# Patient Record
Sex: FEMALE | Race: BLACK OR AFRICAN AMERICAN | Employment: PART TIME | URBAN - METROPOLITAN AREA
[De-identification: names, ages, dates, MRNs, and addresses within clinical notes are randomized per-mention and may not be internally consistent; named-entity substitution may affect disease eponyms.]

---

## 2017-01-25 ENCOUNTER — OFFICE VISIT (OUTPATIENT)
Dept: PRIMARY CARE CLINIC | Age: 27
End: 2017-01-25

## 2017-01-25 VITALS
BODY MASS INDEX: 28.63 KG/M2 | HEART RATE: 72 BPM | HEIGHT: 63 IN | DIASTOLIC BLOOD PRESSURE: 68 MMHG | WEIGHT: 161.6 LBS | TEMPERATURE: 98.2 F | SYSTOLIC BLOOD PRESSURE: 122 MMHG

## 2017-01-25 DIAGNOSIS — R23.3 EASY BRUISING: ICD-10-CM

## 2017-01-25 DIAGNOSIS — Z00.00 WELL ADULT EXAM: Primary | ICD-10-CM

## 2017-01-25 PROCEDURE — 99395 PREV VISIT EST AGE 18-39: CPT | Performed by: INTERNAL MEDICINE

## 2017-01-26 LAB
CHLAMYDIA TRACHOMATIS AMPLIFIED DET: NORMAL
N GONORRHOEAE AMPLIFIED DET: NORMAL

## 2017-05-16 ENCOUNTER — OFFICE VISIT (OUTPATIENT)
Dept: PRIMARY CARE CLINIC | Age: 27
End: 2017-05-16

## 2017-05-16 VITALS
DIASTOLIC BLOOD PRESSURE: 62 MMHG | HEIGHT: 63 IN | WEIGHT: 164 LBS | SYSTOLIC BLOOD PRESSURE: 110 MMHG | BODY MASS INDEX: 29.06 KG/M2

## 2017-05-16 DIAGNOSIS — N76.0 ACUTE VAGINITIS: Primary | ICD-10-CM

## 2017-05-16 PROCEDURE — 99214 OFFICE O/P EST MOD 30 MIN: CPT | Performed by: INTERNAL MEDICINE

## 2017-05-18 ENCOUNTER — TELEPHONE (OUTPATIENT)
Dept: PRIMARY CARE CLINIC | Age: 27
End: 2017-05-18

## 2017-05-18 LAB
CANDIDA SPECIES, DNA PROBE: ABNORMAL
GARDNERELLA VAGINALIS, DNA PROBE: ABNORMAL
TRICHOMONAS VAGINALIS DNA: ABNORMAL

## 2017-05-18 RX ORDER — FLUCONAZOLE 150 MG/1
TABLET ORAL
Qty: 2 TABLET | Refills: 0 | Status: SHIPPED | OUTPATIENT
Start: 2017-05-18 | End: 2017-05-22 | Stop reason: SDUPTHER

## 2017-05-18 RX ORDER — METRONIDAZOLE 500 MG/1
500 TABLET ORAL 2 TIMES DAILY
Qty: 14 TABLET | Refills: 0 | Status: SHIPPED | OUTPATIENT
Start: 2017-05-18 | End: 2017-05-25

## 2017-05-19 RX ORDER — METRONIDAZOLE 7.5 MG/G
1 GEL VAGINAL DAILY
Qty: 5 TUBE | Refills: 0 | Status: SHIPPED | OUTPATIENT
Start: 2017-05-19 | End: 2017-05-22 | Stop reason: SDUPTHER

## 2017-05-19 RX ORDER — METRONIDAZOLE 10 MG/G
GEL TOPICAL DAILY
Qty: 1 TUBE | Refills: 0 | Status: CANCELLED | OUTPATIENT
Start: 2017-05-19

## 2017-05-22 RX ORDER — FLUCONAZOLE 150 MG/1
TABLET ORAL
Qty: 2 TABLET | Refills: 0 | Status: SHIPPED | OUTPATIENT
Start: 2017-05-22 | End: 2017-05-26

## 2017-05-22 RX ORDER — METRONIDAZOLE 7.5 MG/G
1 GEL VAGINAL DAILY
Qty: 5 TUBE | Refills: 0 | Status: SHIPPED | OUTPATIENT
Start: 2017-05-22 | End: 2017-05-27

## 2017-05-26 RX ORDER — FLUCONAZOLE 150 MG/1
TABLET ORAL
Qty: 2 TABLET | Refills: 0 | Status: SHIPPED | OUTPATIENT
Start: 2017-05-26 | End: 2018-02-13

## 2017-10-09 ENCOUNTER — OFFICE VISIT (OUTPATIENT)
Dept: PRIMARY CARE CLINIC | Age: 27
End: 2017-10-09

## 2017-10-09 VITALS
TEMPERATURE: 98.5 F | BODY MASS INDEX: 29.63 KG/M2 | SYSTOLIC BLOOD PRESSURE: 118 MMHG | DIASTOLIC BLOOD PRESSURE: 70 MMHG | WEIGHT: 161 LBS | HEIGHT: 62 IN

## 2017-10-09 DIAGNOSIS — R35.89 POLYURIA: Primary | ICD-10-CM

## 2017-10-09 DIAGNOSIS — N89.8 VAGINAL ITCHING: ICD-10-CM

## 2017-10-09 LAB
BILIRUBIN, POC: ABNORMAL
BLOOD URINE, POC: 6
CLARITY, POC: ABNORMAL
COLOR, POC: ABNORMAL
CONTROL: PRESENT
GLUCOSE URINE, POC: ABNORMAL
KETONES, POC: ABNORMAL
LEUKOCYTE EST, POC: ABNORMAL
NITRITE, POC: ABNORMAL
PH, POC: ABNORMAL
PREGNANCY TEST URINE, POC: NORMAL
PROTEIN, POC: ABNORMAL
SPECIFIC GRAVITY, POC: 1.02
UROBILINOGEN, POC: 0.2

## 2017-10-09 PROCEDURE — 81002 URINALYSIS NONAUTO W/O SCOPE: CPT | Performed by: INTERNAL MEDICINE

## 2017-10-09 PROCEDURE — 81025 URINE PREGNANCY TEST: CPT | Performed by: INTERNAL MEDICINE

## 2017-10-09 PROCEDURE — 99213 OFFICE O/P EST LOW 20 MIN: CPT | Performed by: INTERNAL MEDICINE

## 2017-10-09 RX ORDER — CIPROFLOXACIN 250 MG/1
250 TABLET, FILM COATED ORAL 2 TIMES DAILY
Qty: 10 TABLET | Refills: 0 | Status: SHIPPED | OUTPATIENT
Start: 2017-10-09 | End: 2017-10-14

## 2017-10-10 RX ORDER — METRONIDAZOLE 500 MG/1
500 TABLET ORAL 2 TIMES DAILY
Qty: 14 TABLET | Refills: 0 | Status: SHIPPED | OUTPATIENT
Start: 2017-10-10 | End: 2017-10-23 | Stop reason: SDUPTHER

## 2017-10-11 ENCOUNTER — TELEPHONE (OUTPATIENT)
Dept: PRIMARY CARE CLINIC | Age: 27
End: 2017-10-11

## 2017-10-11 LAB
C. TRACHOMATIS DNA ,URINE: NEGATIVE
N. GONORRHOEAE DNA, URINE: NEGATIVE
URINE CULTURE, ROUTINE: NORMAL

## 2017-10-11 NOTE — TELEPHONE ENCOUNTER
Patient said he wanted to gel form.  SHe said that is what she has used in the past. I tried to explain that an antibiotic was needed but she said to let you know that she wanted the gel form

## 2017-10-12 RX ORDER — METRONIDAZOLE 7.5 MG/G
1 GEL VAGINAL 2 TIMES DAILY
Qty: 1 TUBE | Refills: 0 | Status: SHIPPED | OUTPATIENT
Start: 2017-10-12 | End: 2017-10-16 | Stop reason: SDUPTHER

## 2017-10-16 RX ORDER — METRONIDAZOLE 7.5 MG/G
1 GEL VAGINAL 2 TIMES DAILY
Qty: 1 TUBE | Refills: 0 | Status: SHIPPED | OUTPATIENT
Start: 2017-10-16 | End: 2017-10-21

## 2017-10-23 ENCOUNTER — OFFICE VISIT (OUTPATIENT)
Dept: PRIMARY CARE CLINIC | Age: 27
End: 2017-10-23

## 2017-10-23 VITALS
SYSTOLIC BLOOD PRESSURE: 120 MMHG | DIASTOLIC BLOOD PRESSURE: 70 MMHG | HEIGHT: 62 IN | TEMPERATURE: 98.3 F | WEIGHT: 163 LBS | BODY MASS INDEX: 30 KG/M2

## 2017-10-23 DIAGNOSIS — N93.9 VAGINAL BLEEDING: Primary | ICD-10-CM

## 2017-10-23 LAB
CONTROL: PRESENT
PREGNANCY TEST URINE, POC: NEGATIVE

## 2017-10-23 PROCEDURE — 81025 URINE PREGNANCY TEST: CPT | Performed by: INTERNAL MEDICINE

## 2017-10-23 PROCEDURE — 99214 OFFICE O/P EST MOD 30 MIN: CPT | Performed by: INTERNAL MEDICINE

## 2017-10-23 RX ORDER — METRONIDAZOLE 500 MG/1
500 TABLET ORAL 2 TIMES DAILY
Qty: 14 TABLET | Refills: 0 | Status: SHIPPED | OUTPATIENT
Start: 2017-10-23 | End: 2018-09-11 | Stop reason: SDUPTHER

## 2017-10-23 ASSESSMENT — ENCOUNTER SYMPTOMS
SORE THROAT: 0
COUGH: 0
CHEST TIGHTNESS: 0
VOMITING: 0
SHORTNESS OF BREATH: 0
ABDOMINAL PAIN: 0
NAUSEA: 0

## 2017-10-23 NOTE — PROGRESS NOTES
Subjective:      Patient ID: Bakari Smith is a 24 y.o. female. HPI   Patient with 7 days of spotting which is unusual for her. Her menses should have started around this time. No other symptoms. Had vaginal probe and urine studies 2 weeks ago for vaginal itching and diagnosed with BV but did not take med. No current symptoms. LMP on Sept 23 for 4 days. Review of Systems   Constitutional: Negative for activity change, appetite change, chills, diaphoresis, fever and unexpected weight change. HENT: Negative for sore throat. Respiratory: Negative for cough, chest tightness and shortness of breath. Cardiovascular: Negative for chest pain and palpitations. Gastrointestinal: Negative for abdominal pain, nausea and vomiting. Genitourinary: Positive for vaginal bleeding. Negative for difficulty urinating, dyspareunia, dysuria, flank pain, hematuria, pelvic pain, urgency, vaginal discharge and vaginal pain. Skin: Negative for rash. Objective:   Physical Exam   Constitutional: She appears well-developed and well-nourished. No distress. HENT:   Head: Normocephalic and atraumatic. Eyes: Conjunctivae are normal. Right eye exhibits no discharge. Left eye exhibits no discharge. No scleral icterus. Cardiovascular: Normal rate, regular rhythm and normal heart sounds. Pulmonary/Chest: Effort normal. No respiratory distress. Abdominal: Soft. Bowel sounds are normal. There is no tenderness. Neurological: She is alert. Coordination normal.   Skin: Skin is warm and dry. She is not diaphoretic. Psychiatric: She has a normal mood and affect. Judgment normal.   Vitals reviewed. Assessment:     A total of 25 minutes spent with the patient today greater than 50% in counseling regarding these issues. Vaginal bleeding. Around right time for menses but only spotting. Pregnancy test was negative. No other vaginal or urinary symptoms. Did not treat herself for BV yet.  Patient has made

## 2017-10-23 NOTE — PATIENT INSTRUCTIONS
Patient Education        Bacterial Vaginosis: Care Instructions  Your Care Instructions    Bacterial vaginosis is a type of vaginal infection. It is caused by excess growth of certain bacteria that are normally found in the vagina. Symptoms can include itching, swelling, pain when you urinate or have sex, and a gray or yellow discharge with a \"fishy\" odor. It is not considered an infection that is spread through sexual contact. Although symptoms can be annoying and uncomfortable, bacterial vaginosis does not usually cause other health problems. However, if you have it while you are pregnant, it can cause complications. While the infection may go away on its own, most doctors use antibiotics to treat it. You may have been prescribed pills or vaginal cream. With treatment, bacterial vaginosis usually clears up in 5 to 7 days. Follow-up care is a key part of your treatment and safety. Be sure to make and go to all appointments, and call your doctor if you are having problems. It's also a good idea to know your test results and keep a list of the medicines you take. How can you care for yourself at home? · Take your antibiotics as directed. Do not stop taking them just because you feel better. You need to take the full course of antibiotics. · Do not eat or drink anything that contains alcohol if you are taking metronidazole (Flagyl). · Keep using your medicine if you start your period. Use pads instead of tampons while using a vaginal cream or suppository. Tampons can absorb the medicine. · Wear loose cotton clothing. Do not wear nylon and other materials that hold body heat and moisture close to the skin. · Do not scratch. Relieve itching with a cold pack or a cool bath. · Do not wash your vaginal area more than once a day. Use plain water or a mild, unscented soap. Do not douche. When should you call for help?   Watch closely for changes in your health, and be sure to contact your doctor if:  · You have unexpected vaginal bleeding. · You have a fever. · You have new or increased pain in your vagina or pelvis. · You are not getting better after 1 week. · Your symptoms return after you finish the course of your medicine. Where can you learn more? Go to [email or web address] [email or web address] and sign in to your Access Intelligence account. Enter S701 in the Fantom box to learn more about \"Bacterial Vaginosis: Care Instructions. \"     If you do not have an account, please click on the \"Sign Up Now\" link. Current as of: October 13, 2016  Content Version: 11.3  © 3488-8286 [employer]. Care instructions adapted under license by [facility] ([facility]). If you have questions about a medical condition or this instruction, always ask your healthcare professional. [address] any warranty or liability for your use of this information.

## 2018-01-04 ENCOUNTER — OFFICE VISIT (OUTPATIENT)
Dept: PRIMARY CARE CLINIC | Age: 28
End: 2018-01-04

## 2018-01-04 VITALS
WEIGHT: 164.4 LBS | BODY MASS INDEX: 30.07 KG/M2 | SYSTOLIC BLOOD PRESSURE: 110 MMHG | TEMPERATURE: 98.5 F | DIASTOLIC BLOOD PRESSURE: 60 MMHG

## 2018-01-04 DIAGNOSIS — Z30.8 ENCOUNTER FOR OTHER CONTRACEPTIVE MANAGEMENT: ICD-10-CM

## 2018-01-04 DIAGNOSIS — R30.0 DYSURIA: Primary | ICD-10-CM

## 2018-01-04 DIAGNOSIS — N89.8 VAGINAL ODOR: ICD-10-CM

## 2018-01-04 LAB
BILIRUBIN, POC: NORMAL
BLOOD URINE, POC: NORMAL
CLARITY, POC: NORMAL
COLOR, POC: NORMAL
GLUCOSE URINE, POC: NORMAL
KETONES, POC: NORMAL
LEUKOCYTE EST, POC: NORMAL
NITRITE, POC: NORMAL
PH, POC: 6
PROTEIN, POC: NORMAL
SPECIFIC GRAVITY, POC: 1.01
UROBILINOGEN, POC: NORMAL

## 2018-01-04 PROCEDURE — 81002 URINALYSIS NONAUTO W/O SCOPE: CPT | Performed by: INTERNAL MEDICINE

## 2018-01-04 PROCEDURE — 99214 OFFICE O/P EST MOD 30 MIN: CPT | Performed by: INTERNAL MEDICINE

## 2018-01-04 NOTE — PROGRESS NOTES
Lg Johnson is a 24 y.o. female presenting today with c/o    Vaginal odor  No dysuria  No vaginal discharge  No increased urinary frequency  No nausea, vomiting, back pain  No sensation of incomplete voidine  Noticed 4 days ago  Has not worsened  Has tried no treatment  Fishy odor  Has had BV in past  Last period 17  Had retained tampon  Sexually active  Not using birth control. Last sexually active 4 days ago-tok plan B  Has emailed gynecologist for birth control. Has not heard back  Tried pill, but would forget it   Will be do for pap this April and will schedule with gyn      Review of Systems - comprehensive review of systems negative except as noted in HPI    No Known Allergies    Past Medical History:   Diagnosis Date    Headache(784.0)        No past surgical history on file. Family History   Problem Relation Age of Onset    Heart Disease Father 35      from [place]       Social History     Social History    Marital status: Single     Spouse name: N/A    Number of children: N/A    Years of education: N/A     Occupational History    Not on file. Social History Main Topics    Smoking status: Never Smoker    Smokeless tobacco: Never Used    Alcohol use Yes      Comment: Weekends    Drug use: No    Sexual activity: Not Currently     Partners: Male     Other Topics Concern    Not on file     Social History Narrative    No narrative on file         Current Outpatient Prescriptions on File Prior to Visit   Medication Sig Dispense Refill    fluconazole (DIFLUCAN) 150 MG tablet TAKE 1 TABLET BY MOUTH TODAY. REPEAT AGAIN IN 7 DAYS 2 tablet 0     No current facility-administered medications on file prior to visit.         Vitals:    18 0831   BP: 110/60   Temp: 98.5 °F (36.9 °C)         Wt Readings from Last 3 Encounters:   18 164 lb 6.4 oz (74.6 kg)   10/23/17 163 lb (73.9 kg)   10/09/17 161 lb (73 kg)     BP Readings from Last 3 Encounters:   18 110/60   10/23/17

## 2018-01-05 ENCOUNTER — TELEPHONE (OUTPATIENT)
Dept: PAIN MANAGEMENT | Age: 28
End: 2018-01-05

## 2018-01-05 RX ORDER — METRONIDAZOLE 500 MG/1
500 TABLET ORAL 2 TIMES DAILY
Qty: 14 TABLET | Refills: 0 | Status: SHIPPED | OUTPATIENT
Start: 2018-01-05 | End: 2018-01-12

## 2018-01-06 LAB — URINE CULTURE, ROUTINE: NORMAL

## 2018-02-02 RX ORDER — NORELGESTROMIN AND ETHINYL ESTRADIOL 150; 35 UG/D; UG/D
PATCH TRANSDERMAL
Qty: 12 PATCH | Refills: 11 | Status: SHIPPED | OUTPATIENT
Start: 2018-02-02 | End: 2022-01-22 | Stop reason: ALTCHOICE

## 2018-02-13 ENCOUNTER — OFFICE VISIT (OUTPATIENT)
Dept: PRIMARY CARE CLINIC | Age: 28
End: 2018-02-13

## 2018-02-13 VITALS
SYSTOLIC BLOOD PRESSURE: 128 MMHG | DIASTOLIC BLOOD PRESSURE: 86 MMHG | HEART RATE: 64 BPM | BODY MASS INDEX: 31.17 KG/M2 | WEIGHT: 170.4 LBS

## 2018-02-13 DIAGNOSIS — R11.0 NAUSEA: Primary | ICD-10-CM

## 2018-02-13 DIAGNOSIS — Z20.2 STD EXPOSURE: ICD-10-CM

## 2018-02-13 PROCEDURE — 81025 URINE PREGNANCY TEST: CPT | Performed by: INTERNAL MEDICINE

## 2018-02-13 PROCEDURE — 99214 OFFICE O/P EST MOD 30 MIN: CPT | Performed by: INTERNAL MEDICINE

## 2018-02-13 NOTE — PROGRESS NOTES
kg)   01/04/18 164 lb 6.4 oz (74.6 kg)   10/23/17 163 lb (73.9 kg)     BP Readings from Last 3 Encounters:   02/13/18 128/86   01/04/18 110/60   10/23/17 120/70         /86 (Site: Right Arm, Position: Sitting, Cuff Size: Medium Adult)   Pulse 64   Wt 170 lb 6.4 oz (77.3 kg)   BMI 31.17 kg/m²   General appearance: alert, appears stated age and no distress  Eyes: negative findings: lids and lashes normal and conjunctivae and sclerae normal  Throat: lips, mucosa, and tongue normal; teeth and gums normal  Neck: no adenopathy and thyroid not enlarged, symmetric, no tenderness/mass/nodules  Lungs: clear to auscultation bilaterally  Heart: regular rate and rhythm, S1, S2 normal, no murmur, click, rub or gallop  Skin: Skin is warm and dry. Benjamen Lies Psychiatric: normal mood and affect. speech is normal and behavior is normal. Judgment, cognition and memory are normal.     not applicable    Assessment and Plan  1. Nausea  Neg upreg  Not nauseated currently  Discussed possible viral illness when nauseated and tired  Could consider IUD. She will continue patch for now and discuss with gyn  - POCT urine pregnancy    2. STD exposure  Advised barrier protection to prevent STD's  - Vaginal Pathogens Probes *A  - C.trachomatis N.gonorrhoeae DNA, Urine  - RPR Reflex  - HIV Screen    3.  BMI 31  Continued wt gain  Discussed this is likely source of breast enlargement  D/c soda

## 2018-02-14 LAB
C. TRACHOMATIS DNA ,URINE: NEGATIVE
CANDIDA SPECIES, DNA PROBE: ABNORMAL
GARDNERELLA VAGINALIS, DNA PROBE: ABNORMAL
HIV AG/AB: NORMAL
HIV ANTIGEN: NORMAL
HIV-1 ANTIBODY: NORMAL
HIV-2 AB: NORMAL
N. GONORRHOEAE DNA, URINE: NEGATIVE
TRICHOMONAS VAGINALIS DNA: ABNORMAL

## 2018-02-15 ENCOUNTER — TELEPHONE (OUTPATIENT)
Dept: PRIMARY CARE CLINIC | Age: 28
End: 2018-02-15

## 2018-02-15 LAB — RPR: NON REACTIVE

## 2018-02-15 RX ORDER — METRONIDAZOLE 500 MG/1
500 TABLET ORAL 2 TIMES DAILY
Qty: 14 TABLET | Refills: 0 | Status: SHIPPED | OUTPATIENT
Start: 2018-02-15 | End: 2018-02-25

## 2018-09-07 ENCOUNTER — OFFICE VISIT (OUTPATIENT)
Dept: PRIMARY CARE CLINIC | Age: 28
End: 2018-09-07

## 2018-09-07 VITALS — SYSTOLIC BLOOD PRESSURE: 110 MMHG | WEIGHT: 177 LBS | DIASTOLIC BLOOD PRESSURE: 80 MMHG | BODY MASS INDEX: 32.37 KG/M2

## 2018-09-07 DIAGNOSIS — R35.0 URINE FREQUENCY: Primary | ICD-10-CM

## 2018-09-07 LAB
BILIRUBIN, POC: NORMAL
BLOOD URINE, POC: NORMAL
CLARITY, POC: NORMAL
COLOR, POC: YELLOW
GLUCOSE URINE, POC: NORMAL
KETONES, POC: NORMAL
LEUKOCYTE EST, POC: NORMAL
NITRITE, POC: NORMAL
PH, POC: 7.5
PROTEIN, POC: NORMAL
SPECIFIC GRAVITY, POC: 1.01
UROBILINOGEN, POC: NORMAL

## 2018-09-07 PROCEDURE — 99213 OFFICE O/P EST LOW 20 MIN: CPT | Performed by: INTERNAL MEDICINE

## 2018-09-07 PROCEDURE — 81002 URINALYSIS NONAUTO W/O SCOPE: CPT | Performed by: INTERNAL MEDICINE

## 2018-09-07 RX ORDER — CIPROFLOXACIN 250 MG/1
250 TABLET, FILM COATED ORAL 2 TIMES DAILY
Qty: 6 TABLET | Refills: 0 | Status: SHIPPED | OUTPATIENT
Start: 2018-09-07 | End: 2018-09-10

## 2018-09-07 ASSESSMENT — PATIENT HEALTH QUESTIONNAIRE - PHQ9
SUM OF ALL RESPONSES TO PHQ QUESTIONS 1-9: 0
1. LITTLE INTEREST OR PLEASURE IN DOING THINGS: 0
SUM OF ALL RESPONSES TO PHQ9 QUESTIONS 1 & 2: 0
SUM OF ALL RESPONSES TO PHQ QUESTIONS 1-9: 0
2. FEELING DOWN, DEPRESSED OR HOPELESS: 0

## 2018-09-07 NOTE — PATIENT INSTRUCTIONS
Patient Education        Starting a Weight Loss Plan: Care Instructions  Your Care Instructions    If you are thinking about losing weight, it can be hard to know where to start. Your doctor can help you set up a weight loss plan that best meets your needs. You may want to take a class on nutrition or exercise, or join a weight loss support group. If you have questions about how to make changes to your eating or exercise habits, ask your doctor about seeing a registered dietitian or an exercise specialist.  It can be a big challenge to lose weight. But you do not have to make huge changes at once. Make small changes, and stick with them. When those changes become habit, add a few more changes. If you do not think you are ready to make changes right now, try to pick a date in the future. Make an appointment to see your doctor to discuss whether the time is right for you to start a plan. Follow-up care is a key part of your treatment and safety. Be sure to make and go to all appointments, and call your doctor if you are having problems. It's also a good idea to know your test results and keep a list of the medicines you take. How can you care for yourself at home? · Set realistic goals. Many people expect to lose much more weight than is likely. A weight loss of 5% to 10% of your body weight may be enough to improve your health. · Get family and friends involved to provide support. Talk to them about why you are trying to lose weight, and ask them to help. They can help by participating in exercise and having meals with you, even if they may be eating something different. · Find what works best for you. If you do not have time or do not like to cook, a program that offers meal replacement bars or shakes may be better for you.  Or if you like to prepare meals, finding a plan that includes daily menus and recipes may be best.  · Ask your doctor about other health professionals who can help you achieve your weight

## 2018-09-09 LAB
CANDIDA SPECIES, DNA PROBE: ABNORMAL
GARDNERELLA VAGINALIS, DNA PROBE: ABNORMAL
TRICHOMONAS VAGINALIS DNA: ABNORMAL
URINE CULTURE, ROUTINE: NORMAL

## 2018-09-11 LAB
C. TRACHOMATIS DNA ,URINE: NEGATIVE
N. GONORRHOEAE DNA, URINE: NEGATIVE

## 2018-09-11 RX ORDER — METRONIDAZOLE 500 MG/1
500 TABLET ORAL 2 TIMES DAILY
Qty: 14 TABLET | Refills: 0 | Status: SHIPPED | OUTPATIENT
Start: 2018-09-11 | End: 2018-09-18

## 2019-05-26 ENCOUNTER — HOSPITAL ENCOUNTER (EMERGENCY)
Age: 29
Discharge: HOME | End: 2019-05-26
Attending: EMERGENCY MEDICINE
Payer: COMMERCIAL

## 2019-05-26 VITALS
OXYGEN SATURATION: 100 % | BODY MASS INDEX: 33.45 KG/M2 | HEART RATE: 99 BPM | SYSTOLIC BLOOD PRESSURE: 119 MMHG | WEIGHT: 188.8 LBS | DIASTOLIC BLOOD PRESSURE: 64 MMHG | RESPIRATION RATE: 20 BRPM | TEMPERATURE: 99 F | HEIGHT: 63 IN
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
## 2019-05-26 DIAGNOSIS — S10.91XA ABRASION OF NECK, INITIAL ENCOUNTER: ICD-10-CM

## 2019-05-26 LAB
BASOPHILS ABSOLUTE: 0.1 K/UL (ref 0–0.2)
BASOPHILS RELATIVE PERCENT: 0.5 %
EOSINOPHILS ABSOLUTE: 0.1 K/UL (ref 0–0.6)
EOSINOPHILS RELATIVE PERCENT: 1.1 %
HCT VFR BLD CALC: 38.1 % (ref 36–48)
HEMOGLOBIN: 12.4 G/DL (ref 12–16)
LYMPHOCYTES ABSOLUTE: 1.5 K/UL (ref 1–5.1)
LYMPHOCYTES RELATIVE PERCENT: 13.8 %
MCH RBC QN AUTO: 29.2 PG (ref 26–34)
MCHC RBC AUTO-ENTMCNC: 32.6 G/DL (ref 31–36)
MCV RBC AUTO: 89.6 FL (ref 80–100)
MONOCYTES ABSOLUTE: 1 K/UL (ref 0–1.3)
MONOCYTES RELATIVE PERCENT: 8.6 %
NEUTROPHILS ABSOLUTE: 8.4 K/UL (ref 1.7–7.7)
NEUTROPHILS RELATIVE PERCENT: 76 %
PDW BLD-RTO: 14.7 % (ref 12.4–15.4)
PLATELET # BLD: 230 K/UL (ref 135–450)
PMV BLD AUTO: 7.3 FL (ref 5–10.5)
RBC # BLD: 4.25 M/UL (ref 4–5.2)
WBC # BLD: 11 K/UL (ref 4–11)

## 2019-05-26 PROCEDURE — 6360000002 HC RX W HCPCS: Performed by: EMERGENCY MEDICINE

## 2019-05-26 PROCEDURE — 85025 COMPLETE CBC W/AUTO DIFF WBC: CPT

## 2019-05-26 PROCEDURE — 90471 IMMUNIZATION ADMIN: CPT | Performed by: EMERGENCY MEDICINE

## 2019-05-26 PROCEDURE — 36415 COLL VENOUS BLD VENIPUNCTURE: CPT

## 2019-05-26 PROCEDURE — 99284 EMERGENCY DEPT VISIT MOD MDM: CPT

## 2019-05-26 PROCEDURE — 90715 TDAP VACCINE 7 YRS/> IM: CPT | Performed by: EMERGENCY MEDICINE

## 2019-05-26 RX ORDER — AMOXICILLIN AND CLAVULANATE POTASSIUM 875; 125 MG/1; MG/1
1 TABLET, FILM COATED ORAL 2 TIMES DAILY
Qty: 20 TABLET | Refills: 0 | Status: SHIPPED | OUTPATIENT
Start: 2019-05-26 | End: 2019-06-05

## 2019-05-26 RX ADMIN — TETANUS TOXOID, REDUCED DIPHTHERIA TOXOID AND ACELLULAR PERTUSSIS VACCINE, ADSORBED 0.5 ML: 5; 2.5; 8; 8; 2.5 SUSPENSION INTRAMUSCULAR at 12:14

## 2019-05-26 ASSESSMENT — PAIN DESCRIPTION - LOCATION: LOCATION: FACE

## 2019-05-26 ASSESSMENT — PAIN DESCRIPTION - DESCRIPTORS: DESCRIPTORS: DISCOMFORT

## 2019-05-26 ASSESSMENT — PAIN SCALES - GENERAL: PAINLEVEL_OUTOF10: 2

## 2019-05-26 ASSESSMENT — PAIN DESCRIPTION - PAIN TYPE: TYPE: ACUTE PAIN
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
## 2022-01-22 ENCOUNTER — APPOINTMENT (OUTPATIENT)
Dept: ULTRASOUND IMAGING | Age: 32
End: 2022-01-22
Payer: COMMERCIAL

## 2022-01-22 ENCOUNTER — HOSPITAL ENCOUNTER (EMERGENCY)
Age: 32
Discharge: HOME | End: 2022-01-22
Attending: EMERGENCY MEDICINE
Payer: COMMERCIAL

## 2022-01-22 VITALS
TEMPERATURE: 98.6 F | SYSTOLIC BLOOD PRESSURE: 122 MMHG | WEIGHT: 203.5 LBS | OXYGEN SATURATION: 100 % | HEART RATE: 80 BPM | RESPIRATION RATE: 16 BRPM | DIASTOLIC BLOOD PRESSURE: 65 MMHG | BODY MASS INDEX: 37.45 KG/M2 | HEIGHT: 62 IN

## 2022-01-22 DIAGNOSIS — O20.0 THREATENED MISCARRIAGE: Primary | ICD-10-CM

## 2022-01-22 LAB
ABO/RH: NORMAL
ANTIBODY SCREEN: NORMAL
BACTERIA WET PREP: NORMAL
BASOPHILS ABSOLUTE: 0.1 K/UL (ref 0–0.2)
BASOPHILS RELATIVE PERCENT: 0.9 %
CLUE CELLS: NORMAL
EOSINOPHILS ABSOLUTE: 0.2 K/UL (ref 0–0.6)
EOSINOPHILS RELATIVE PERCENT: 1.8 %
EPITHELIAL CELLS WET PREP: NORMAL
GONADOTROPIN, CHORIONIC (HCG) QUANT: 7.8 MIU/ML
HCG QUALITATIVE: POSITIVE
HCT VFR BLD CALC: 39.4 % (ref 36–48)
HEMOGLOBIN: 13.3 G/DL (ref 12–16)
LYMPHOCYTES ABSOLUTE: 1.8 K/UL (ref 1–5.1)
LYMPHOCYTES RELATIVE PERCENT: 21.4 %
MCH RBC QN AUTO: 30 PG (ref 26–34)
MCHC RBC AUTO-ENTMCNC: 33.8 G/DL (ref 31–36)
MCV RBC AUTO: 88.7 FL (ref 80–100)
MONOCYTES ABSOLUTE: 0.6 K/UL (ref 0–1.3)
MONOCYTES RELATIVE PERCENT: 6.9 %
NEUTROPHILS ABSOLUTE: 5.8 K/UL (ref 1.7–7.7)
NEUTROPHILS RELATIVE PERCENT: 69 %
PDW BLD-RTO: 14.3 % (ref 12.4–15.4)
PLATELET # BLD: 285 K/UL (ref 135–450)
PMV BLD AUTO: 8.6 FL (ref 5–10.5)
RBC # BLD: 4.44 M/UL (ref 4–5.2)
RBC WET PREP: NORMAL
SOURCE WET PREP: NORMAL
TRICHOMONAS PREP: NORMAL
WBC # BLD: 8.4 K/UL (ref 4–11)
WBC WET PREP: NORMAL
YEAST WET PREP: NORMAL

## 2022-01-22 PROCEDURE — 87210 SMEAR WET MOUNT SALINE/INK: CPT

## 2022-01-22 PROCEDURE — 99283 EMERGENCY DEPT VISIT LOW MDM: CPT

## 2022-01-22 PROCEDURE — 86850 RBC ANTIBODY SCREEN: CPT

## 2022-01-22 PROCEDURE — 84702 CHORIONIC GONADOTROPIN TEST: CPT

## 2022-01-22 PROCEDURE — 84703 CHORIONIC GONADOTROPIN ASSAY: CPT

## 2022-01-22 PROCEDURE — 87491 CHLMYD TRACH DNA AMP PROBE: CPT

## 2022-01-22 PROCEDURE — 76801 OB US < 14 WKS SINGLE FETUS: CPT

## 2022-01-22 PROCEDURE — 87591 N.GONORRHOEAE DNA AMP PROB: CPT

## 2022-01-22 PROCEDURE — 85025 COMPLETE CBC W/AUTO DIFF WBC: CPT

## 2022-01-22 PROCEDURE — 86900 BLOOD TYPING SEROLOGIC ABO: CPT

## 2022-01-22 PROCEDURE — 36415 COLL VENOUS BLD VENIPUNCTURE: CPT

## 2022-01-22 PROCEDURE — 86901 BLOOD TYPING SEROLOGIC RH(D): CPT

## 2022-01-22 NOTE — ED NOTES
Bed: B13-13  Expected date:   Expected time:   Means of arrival:   Comments:  NADIR Johnson RN  01/22/22 [id number]

## 2022-01-22 NOTE — ED NOTES
IV attempted in right ac without success although blood specimens obtained and will send to lab.       Fernando Lara RN  01/22/22 2314

## 2022-01-22 NOTE — ED NOTES
Pt states that her last period was December 5th and she missed her period in January and took a home pregnancy test Sunday and it was positive and pt states that she started with vaginal bleeding yesterday and did pass tissue at home and pt denies any pain at this time.       Glenys Mayer RN  01/22/22 5169

## 2022-01-22 NOTE — ED PROVIDER NOTES
[address] [facility]          ATTENDING PHYSICIAN NOTE       Date of evaluation: 2022    Chief Complaint     Vaginal Bleeding (pt states having bleeding yesterday and she took home pregnancy tests on  and 2 were positive)      History of Present Illness     Brenton Castillo is a 36 y.o. female who presents with vaginal bleeding. Patient states she is approximately 3 weeks pregnant. She has been pregnant 1 other time in the past where she had an . She noticed vaginal bleeding that started yesterday. She went through 2 pads overnight. She does not have significant abdominal pain with her symptoms. She was initially seen in the [place] ER. Her hCG quant was 7.8. CBC with normal hemoglobin. She was transferred to the [facility] [place] [facility] for:  Ultrasound to assess for ectopic  Pelvic examination  Type and screen to assess for Rh    Review of Systems     Positive for: vaginal bleeding  Negative for: Abdominal pain, syncope, dizziness    Other systems reviewed and negative. Past Medical, Surgical, Family, and Social History     She has a past medical history of Headache(784.0). She has no past surgical history on file. Her family history includes Heart Disease (age of onset: 35) in her father. She reports that she has never smoked. She has never used smokeless tobacco. She reports current alcohol use. She reports that she does not use drugs. Medications     There are no discharge medications for this patient. Allergies     She has No Known Allergies.     Physical Exam     INITIAL VITALS: BP: 135/64, Temp: 98.1 °F (36.7 °C), Pulse: 88, Resp: 18, SpO2: 100 %     General: nontoxic, no acute distress   HEENT: NCAT, EOMI grossly intact, external nose normal, no stridor  Neck: supple, no pain with movement  Cardiac: normal rate, regular rhythm, well perfused  Respiratory:  no respiratory distress, no cough  Abdominal: soft, nontender to palpation, no rebound tenderness  Pelvic: Chaperoned pelvic exam shows no significant external lesions, small amount of blood in vaginal vault, cervix appears closed on speculum exam, no significant discharge, no CMT during bimanual exam, cervix feels closed on bimanual exam  Extremities: no pitting edema  Musculoskeletal: no long bone deformities  Skin: no diaphoresis, no rash  Neuro: alert and oriented, moving extremities symmetrically, clear speech  Psych: appropriate mood, normal affect    Diagnostic Results     EKG    N/A    RADIOLOGY:  US OB LESS THAN 14 WEEKS SINGLE OR FIRST GESTATION   Final Result   1. No intrauterine gestational sac identified. No adnexal mass identified. Clinical correlation and correlation with serial beta hCG measurements recommended.           LABS:   Results for orders placed or performed during the hospital encounter of 01/22/22   Wet prep, genital    Specimen: Vaginal   Result Value Ref Range    Trichomonas Prep None Seen     Yeast, Wet Prep None Seen     Clue Cells, Wet Prep None Seen     WBC, Wet Prep <1+     RBC, Wet Prep <1+     Epi Cells <1+     Bacteria 2+     Source Wet Prep Vaginal    CBC Auto Differential   Result Value Ref Range    WBC 8.4 4.0 - 11.0 K/uL    RBC 4.44 4.00 - 5.20 M/uL    Hemoglobin 13.3 12.0 - 16.0 g/dL    Hematocrit 39.4 36.0 - 48.0 %    MCV 88.7 80.0 - 100.0 fL    MCH 30.0 26.0 - 34.0 pg    MCHC 33.8 31.0 - 36.0 g/dL    RDW 14.3 12.4 - 15.4 %    Platelets 848 359 - 245 K/uL    MPV 8.6 5.0 - 10.5 fL    Neutrophils % 69.0 %    Lymphocytes % 21.4 %    Monocytes % 6.9 %    Eosinophils % 1.8 %    Basophils % 0.9 %    Neutrophils Absolute 5.8 1.7 - 7.7 K/uL    Lymphocytes Absolute 1.8 1.0 - 5.1 K/uL    Monocytes Absolute 0.6 0.0 - 1.3 K/uL    Eosinophils Absolute 0.2 0.0 - 0.6 K/uL    Basophils Absolute 0.1 0.0 - 0.2 K/uL   HCG Qualitative, Serum   Result Value Ref Range    hCG Qual POSITIVE Detects HCG level >10 MIU/mL   HCG, Quantitative, Pregnancy   Result Value Ref Range hCG Quant 7.8 <5.0 mIU/mL   TYPE AND SCREEN   Result Value Ref Range    ABO/Rh O POS     Antibody Screen NEG        ED BEDSIDE ULTRASOUND:   N/A    RECENT VITALS:  BP: 122/65,Temp: 98.6 °F (37 °C), Pulse: 80, Resp: 16, SpO2: 100 %     Procedures      N/A    ED Course     Nursing Notes, Past Medical Hx, Past Surgical Hx, Social Hx,Allergies, and Family Hx were reviewed. patient was given the following medications:  No orders of the defined types were placed in this encounter. CONSULTS:  None    MEDICAL DECISIONMAKING / ASSESSMENT / PLAN     Анна Yan is a 36 y.o. female presents for assessment for ectopic pregnancy. Beta-hCG is approximately 8. Her abdomen exam is reassuring. Small mount of blood in her vaginal vault with pelvic exam.  Cervix appears closed and felt close with bimanual exam.  Ultrasound did not find any IUP or other signs of ectopic, however given her low beta hCG this is likely still too early. I gave patient instructions that she needs to follow-up with OB/GYN in 48 hours for repeat beta hCG level. She has not been completely ruled out for ectopic at this time given that it is likely still very early in her pregnancy. She was given return precautions for any worsening symptoms as well. Her type and screen showed that she was Rh+, RhoGAM not indicated. Patient understands the plan and is stable for discharge    Clinical Impression     1. Threatened miscarriage        Disposition     PATIENT REFERRED TO:  Dali Cornejo MD  [address] [place],[id number] [facility]  [phone]            DISCHARGE MEDICATIONS:  There are no discharge medications for this patient.       DISPOSITION Decision To Discharge 01/22/2022 05:41:53 PM     Jesus Mccord MD  01/22/22 8163

## 2022-01-22 NOTE — ED NOTES
Radiology dept notified to call [address] in at this time.       Ayanna Vaca RN  01/22/22 7585

## 2022-01-22 NOTE — ED PROVIDER NOTES
[address] [facility]  EMERGENCY DEPARTMENTENCOUNTER      Pt Name: Mitchel Schaeffer  MRN: [id number]  [place] 1990  Date ofevaluation: 2022  Provider: Flavia Diaz MD    CHIEF COMPLAINT       Chief Complaint   Patient presents with    Vaginal Bleeding     pt states having bleeding yesterday and she took home pregnancy tests on  and 2 were positive       HPI    HISTORY OF PRESENT ILLNESS   (Location/Symptom, Timing/Onset,Context/Setting, Quality, Duration, Modifying Factors, Severity)  Note limiting factors. Mitchel Schaeffer is a 36 y.o. female who presents to the emergency department with vaginal bleeding. This is a 61-year-old female who is a  female who presents with a positive pregnancy test and vaginal bleeding. The patient states she started bleeding yesterday and has had several episodes of blood in her pad. The bleeding is not severe. The patient has also had some abdominal cramping. She is a G2, P0 female with history of 1 elective . NursingNotes were reviewed. Review of Systems    REVIEW OF SYSTEMS    (2-9 systems for level 4, 10 or more for level 5)     Review of Systems   Constitutional: Negative for fever. HENT: Negative for rhinorrhea and sore throat. Eyes: Negative for redness. Respiratory: Negative for shortness of breath. Cardiovascular: Negative for chest pain. Gastrointestinal: Negative for abdominal pain. Genitourinary: Negative for flank pain. Neurological: Negative for headaches. Hematological: Negative for adenopathy. Psychiatric/Behavioral: Negative for confusion. Except as noted above the remainder of the review of systems was reviewed and negative. PAST MEDICAL HISTORY     Past Medical History:   Diagnosis Date    Headache(784.0)          SURGICALHISTORY     History reviewed. No pertinent surgical history.       CURRENT MEDICATIONS       Previous Medications    No medications on file ALLERGIES     Patient has no known allergies. FAMILY HISTORY       Family History   Problem Relation Age of Onset    Heart Disease Father 35         from [place]          SOCIAL HISTORY       Social History     Socioeconomic History    Marital status: Single     Spouse name: None    Number of children: None    Years of education: None    Highest education level: None   Occupational History    None   Tobacco Use    Smoking status: Never Smoker    Smokeless tobacco: Never Used   Substance and Sexual Activity    Alcohol use: Yes     Comment: Weekends    Drug use: No    Sexual activity: Not Currently     Partners: Male   Other Topics Concern    None   Social History Narrative    None     Social Determinants of Health     Financial Resource Strain:     Difficulty of Paying Living Expenses: Not on file   Food Insecurity:     Worried About Running Out of Food in the Last Year: Not on file    Jeremias of Food in the Last Year: Not on file   Transportation Needs:     Lack of Transportation (Medical): Not on file    Lack of Transportation (Non-Medical):  Not on file   Physical Activity:     Days of Exercise per Week: Not on file    Minutes of Exercise per Session: Not on file   Stress:     Feeling of Stress : Not on file   Social Connections:     Frequency of Communication with Friends and Family: Not on file    Frequency of Social Gatherings with Friends and Family: Not on file    Attends Roman [employer] Services: Not on file    Active Member of Clubs or Organizations: Not on file    Attends Club or Organization Meetings: Not on file    Marital Status: Not on file   Intimate Partner Violence:     Fear of Current or Ex-Partner: Not on file    Emotionally Abused: Not on file    Physically Abused: Not on file    Sexually Abused: Not on file   Housing Stability:     Unable to Pay for Housing in the Last Year: Not on file    Number of Places Lived in the Last Year: Not on file    Unstable Housing in the Last Year: Not on file       SCREENINGS             PHYSICAL EXAM    (up to 7 for level 4, 8 or more for level 5)     ED Triage Vitals [01/22/22 1123]   BP Temp Temp Source Pulse Resp SpO2 Height Weight   135/64 98.1 °F (36.7 °C) Oral 88 18 100 % 5' 2\" (1.575 m) 203 lb 8 oz (92.3 kg)       Physical Exam:      General Appearance:  Alert, cooperative, appears stated age. Head:  Normocephalic, without obvious abnormality, atraumatic. Eyes:  conjunctiva/corneas clear, EOM's intact. Sclera anicteric. ENT:  Mucous remains moist and pink   Neck: Supple, symmetrical, trachea midline, no adenopathy. No jugular venous distention. Lungs:    Clear to auscultation bilaterally   Chest Wall:   No pain to palpation   Heart:   Genitourinary:  Regular rate rhythm with no murmurs rubs gallops  Deferred at this point   Abdomen:    Soft and benign. No guarding or rebound. Extremities:  No clubbing cyanosis or edema   Pulses:  Good throughout   Skin:  No rashes or lesions to exposed skin. Neurologic: Alert and oriented X 3. DIAGNOSTIC RESULTS         RADIOLOGY:   Non-plain filmimages such as CT, Ultrasound and MRI are read by the radiologist. Plain radiographic images are visualized and preliminarily interpreted by the emergency physician with the below findings:    See below    Interpretation per the Radiologist below, if available at the time ofthis note: All incidental findings were discussed with the patient.     No orders to display         ED BEDSIDE ULTRASOUND:   Performed by ED Physician - none    LABS:  Labs Reviewed   CBC WITH AUTO DIFFERENTIAL    Narrative:     Performed at:  [facility]  [address],  [place], [place]   Phone [phone]   HCG, SERUM, QUALITATIVE    Narrative:     Performed at:  [facility]  [address],  [place]   Phone [phone]   HCG, QUANTITATIVE, PREGNANCY    Narrative:     Performed at:  [facility]  [address]   Phone [phone]       All other labs were within normal range or not returned as of this dictation. EMERGENCY DEPARTMENT COURSE and DIFFERENTIAL DIAGNOSIS/MDM:   Vitals:    Vitals:    01/22/22 1123   BP: 135/64   Pulse: 88   Resp: 18   Temp: 98.1 °F (36.7 °C)   TempSrc: Oral   SpO2: 100%   Weight: 203 lb 8 oz (92.3 kg)   Height: 5' 2\" (1.575 m)           MDM    The patient has remained stable throughout her hospital course. Her beta-hCG was positive but low at around 8. I am unable to get a Rh type here at this facility. At this point my impression is threatened miscarriage. My differential include ectopic as well. We do not have ultrasound imaging at this facility. I have contacted Dr. Jose Fagan, emergency department physician at the [facility] who has graciously agreed to have the patient transferred to his emergency department for further work-up including ultrasound and Rh type. At this point I have not done a pelvic because she is not having extensive bleeding but that will also be evaluated during her examination. She was discharged with her boyfriend who is going to drive her to [facility] right away. They are not to stop for anything else    REASSESSMENT              CONSULTS:  None    PROCEDURES:  Unless otherwise noted below, none     Procedures    FINAL IMPRESSION      1. Threatened miscarriage          DISPOSITION/PLAN   DISPOSITION Decision To Transfer 01/22/2022 01:28:23 PM      PATIENT REFERREDTO:  No follow-up provider specified. DISCHARGEMEDICATIONS:  New Prescriptions    No medications on file     Controlled Substances Monitoring:     No flowsheet data found.     (Please note that portions of this note were completed with a voice recognition program.  Efforts were made to edit the dictations but occasionally words are mis-transcribed.)    Montserrat Null MD (electronically signed)  Attending Emergency Physician          Montserrat Null MD  01/22/22 9391

## 2022-01-22 NOTE — ED NOTES
Dr Katja Lindsay has been to bedside to talk with pt about results and plan of care.      Nanette Navarro RN  01/22/22 7708

## 2022-01-25 LAB
C TRACH DNA GENITAL QL NAA+PROBE: NEGATIVE
N. GONORRHOEAE DNA: NEGATIVE
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
## 2023-01-20 RX ORDER — M-VIT,TX,IRON,MINS/CALC/FOLIC 27MG-0.4MG
1 TABLET ORAL DAILY
COMMUNITY

## 2023-01-20 RX ORDER — ASCORBIC ACID 500 MG
1000 TABLET ORAL DAILY
COMMUNITY

## 2023-01-20 RX ORDER — ACETAMINOPHEN 160 MG
TABLET,DISINTEGRATING ORAL DAILY
COMMUNITY

## 2023-01-20 NOTE — PROGRESS NOTES
Name_______________________________________Printed:____________________  Date and time of surgery__1/25/23  0845  [facility]______________________Arrival Time:__0715______________   1. The instructions given regarding when and if a patient needs to stop oral intake prior to surgery varies. Follow the specific instructions you were given                  _x__Nothing to eat or to drink after Midnight the night before.                   ____Carbo loading or ERAS instructions will be given to select patients-if you have been given those instructions -please do the following                           The evening before your surgery after dinner before midnight drink 40 ounces of gatorade. If you are diabetic use sugar free. The morning of surgery drink 40 ounces of water. This needs to be finished 3 hours prior to your surgery start time. 2. Take the following pills with a small sip of water on the morning of surgery: none                  Do not take blood pressure medications ending in pril or sartan the laura prior to surgery or the morning of surgery. Dr Nidia Villalpando patient are not to take any medications the AM of surgery. 3. Aspirin, Ibuprofen, Advil, Naproxen, Vitamin E and other Anti-inflammatory products and supplements should be stopped for 5 -7days before surgery or as directed by your physician. 4. Check with your Doctor regarding stopping Plavix, Coumadin,Eliquis, Lovenox,Effient,Pradaxa,Xarelto, Fragmin or other blood thinners and follow their instructions. 5. Do not smoke, and do not drink any alcoholic beverages 24 hours prior to surgery. This includes NA Beer. Refrain from the usage of any recreational drugs. 6. You may brush your teeth and gargle the morning of surgery. DO NOT SWALLOW WATER   7. You MUST make arrangements for a responsible adult to stay on site while you are here and take you home after your surgery. You will not be allowed to leave alone or drive yourself home.   It is strongly suggested someone stay with you the first 24 hrs. Your surgery will be cancelled if you do not have a ride home. 8. A parent/legal guardian must accompany a child scheduled for surgery and plan to stay at the hospital until the child is discharged. Please do not bring other children with you. 9. Please wear simple, loose fitting clothing to the hospital.  J Carlos Mehta not bring valuables (money, credit cards, checkbooks, etc.) Do not wear any makeup (including no eye makeup) or nail polish on your fingers or toes. 10. DO NOT wear any jewelry or piercings on day of surgery. All body piercing jewelry must be removed. 11. If you have ___dentures, they will be removed before going to the OR; we will provide you a container. If you wear ___contact lenses or ___glasses, they will be removed; please bring a case for them. 12. Please see your family doctor/pediatrician for a history & physical and/or concerning medications. Bring any test results/reports from your physician's office. PCP__________________Phone___________H&P Appt. Date________             13 If you  have a Living Will and Durable Power of  for Healthcare, please bring in a copy. 15. Notify your Surgeon if you develop any illness between now and surgery  time, cough, cold, fever, sore throat, nausea, vomiting, etc.  Please notify your surgeon if you experience dizziness, shortness of breath or blurred vision between now & the time of your surgery             15. DO NOT shave your operative site 96 hours prior to surgery. For face & neck surgery, men may use an electric razor 48 hours prior to surgery. 16. Shower the night before or morning of surgery using an antibacterial soap or as you have been instructed. 17. To provide excellent care visitors will be limited to one in the room at any given time. 18.  Please bring picture ID and insurance card.              19. Visit our web site for additional information:  [email or web address]              20.During flu season no children under the age of 15 are permitted in the hospital for the safety of all patients. 21. If you take a long acting insulin in the evening only  take half of your usual  dose the night  before your procedure              22. If you use a c-pap please bring DOS if staying overnight,             23.For your convenience [facility] has a pharmacy on site to fill your prescriptions. 24. If you use oxygen and have a portable tank please bring it  with you the DOS             25. Bring a complete list of all your medications with name and dose include any supplements. 26. Other__________________________________________   *Please call pre admission testing if you any further questions   [address] [facility] [address]  [phone]   [address] [place] [address]       VISITOR POLICY(subject to change)    Current policy is 2 visitors per patient. No children. Mask is  at the discretion of the facility. Visiting hours are 8a-8p. Overnight visitors will be at the discretion of the nurse. All policies subject to change. All above information reviewed with patient in person or by phone. Patient verbalizes understanding. All questions and concerns addressed.                                                                                                  Patient/Rep____patient________________                                                                                                                                    PRE OP INSTRUCTIONS

## 2023-01-25 ENCOUNTER — HOSPITAL ENCOUNTER (OUTPATIENT)
Age: 33
Setting detail: OUTPATIENT SURGERY
Discharge: HOME | End: 2023-01-25
Attending: PLASTIC SURGERY | Admitting: PLASTIC SURGERY
Payer: COMMERCIAL

## 2023-01-25 ENCOUNTER — ANESTHESIA (OUTPATIENT)
Dept: OPERATING ROOM | Age: 33
End: 2023-01-25
Payer: COMMERCIAL

## 2023-01-25 ENCOUNTER — ANESTHESIA EVENT (OUTPATIENT)
Dept: OPERATING ROOM | Age: 33
End: 2023-01-25
Payer: COMMERCIAL

## 2023-01-25 VITALS
RESPIRATION RATE: 13 BRPM | SYSTOLIC BLOOD PRESSURE: 110 MMHG | WEIGHT: 206 LBS | OXYGEN SATURATION: 100 % | HEIGHT: 63 IN | DIASTOLIC BLOOD PRESSURE: 78 MMHG | TEMPERATURE: 97.2 F | HEART RATE: 90 BPM | BODY MASS INDEX: 36.5 KG/M2

## 2023-01-25 DIAGNOSIS — L91.0 HYPERTROPHIC SCAR: ICD-10-CM

## 2023-01-25 DIAGNOSIS — L91.0 KELOID SCAR DUE TO BURN: Primary | ICD-10-CM

## 2023-01-25 LAB — HCG(URINE) PREGNANCY TEST: NEGATIVE

## 2023-01-25 PROCEDURE — 3700000000 HC ANESTHESIA ATTENDED CARE: Performed by: PLASTIC SURGERY

## 2023-01-25 PROCEDURE — 3600000002 HC SURGERY LEVEL 2 BASE: Performed by: PLASTIC SURGERY

## 2023-01-25 PROCEDURE — 6360000002 HC RX W HCPCS: Performed by: NURSE ANESTHETIST, CERTIFIED REGISTERED

## 2023-01-25 PROCEDURE — 2709999900 HC NON-CHARGEABLE SUPPLY: Performed by: PLASTIC SURGERY

## 2023-01-25 PROCEDURE — 7100000010 HC PHASE II RECOVERY - FIRST 15 MIN: Performed by: PLASTIC SURGERY

## 2023-01-25 PROCEDURE — 7100000000 HC PACU RECOVERY - FIRST 15 MIN: Performed by: PLASTIC SURGERY

## 2023-01-25 PROCEDURE — 2500000003 HC RX 250 WO HCPCS: Performed by: PLASTIC SURGERY

## 2023-01-25 PROCEDURE — 7100000001 HC PACU RECOVERY - ADDTL 15 MIN: Performed by: PLASTIC SURGERY

## 2023-01-25 PROCEDURE — 3600000012 HC SURGERY LEVEL 2 ADDTL 15MIN: Performed by: PLASTIC SURGERY

## 2023-01-25 PROCEDURE — 7100000011 HC PHASE II RECOVERY - ADDTL 15 MIN: Performed by: PLASTIC SURGERY

## 2023-01-25 PROCEDURE — 88305 TISSUE EXAM BY PATHOLOGIST: CPT

## 2023-01-25 PROCEDURE — 2500000003 HC RX 250 WO HCPCS: Performed by: NURSE ANESTHETIST, CERTIFIED REGISTERED

## 2023-01-25 PROCEDURE — 2580000003 HC RX 258: Performed by: PLASTIC SURGERY

## 2023-01-25 PROCEDURE — 6370000000 HC RX 637 (ALT 250 FOR IP): Performed by: PLASTIC SURGERY

## 2023-01-25 PROCEDURE — 6360000002 HC RX W HCPCS: Performed by: PLASTIC SURGERY

## 2023-01-25 PROCEDURE — 84703 CHORIONIC GONADOTROPIN ASSAY: CPT

## 2023-01-25 PROCEDURE — 6370000000 HC RX 637 (ALT 250 FOR IP): Performed by: ANESTHESIOLOGY

## 2023-01-25 PROCEDURE — 3700000001 HC ADD 15 MINUTES (ANESTHESIA): Performed by: PLASTIC SURGERY

## 2023-01-25 RX ORDER — HALOPERIDOL 5 MG/ML
1 INJECTION INTRAMUSCULAR
Status: DISCONTINUED | OUTPATIENT
Start: 2023-01-25 | End: 2023-01-25 | Stop reason: HOSPADM

## 2023-01-25 RX ORDER — MEPERIDINE HYDROCHLORIDE 25 MG/ML
12.5 INJECTION INTRAMUSCULAR; INTRAVENOUS; SUBCUTANEOUS
Status: DISCONTINUED | OUTPATIENT
Start: 2023-01-25 | End: 2023-01-25 | Stop reason: HOSPADM

## 2023-01-25 RX ORDER — SODIUM CHLORIDE 0.9 % (FLUSH) 0.9 %
5-40 SYRINGE (ML) INJECTION PRN
Status: DISCONTINUED | OUTPATIENT
Start: 2023-01-25 | End: 2023-01-25 | Stop reason: HOSPADM

## 2023-01-25 RX ORDER — LIDOCAINE HYDROCHLORIDE AND EPINEPHRINE 10; 10 MG/ML; UG/ML
INJECTION, SOLUTION INFILTRATION; PERINEURAL
Status: COMPLETED | OUTPATIENT
Start: 2023-01-25 | End: 2023-01-25

## 2023-01-25 RX ORDER — CEPHALEXIN 500 MG/1
500 CAPSULE ORAL 4 TIMES DAILY
Qty: 28 CAPSULE | Refills: 0 | Status: SHIPPED | OUTPATIENT
Start: 2023-01-25 | End: 2023-02-01

## 2023-01-25 RX ORDER — OXYCODONE HYDROCHLORIDE 5 MG/1
5 TABLET ORAL
Status: COMPLETED | OUTPATIENT
Start: 2023-01-25 | End: 2023-01-25

## 2023-01-25 RX ORDER — SODIUM CHLORIDE 9 MG/ML
INJECTION, SOLUTION INTRAVENOUS PRN
Status: DISCONTINUED | OUTPATIENT
Start: 2023-01-25 | End: 2023-01-25 | Stop reason: HOSPADM

## 2023-01-25 RX ORDER — HYDRALAZINE HYDROCHLORIDE 20 MG/ML
10 INJECTION INTRAMUSCULAR; INTRAVENOUS
Status: DISCONTINUED | OUTPATIENT
Start: 2023-01-25 | End: 2023-01-25 | Stop reason: HOSPADM

## 2023-01-25 RX ORDER — MAGNESIUM SULFATE HEPTAHYDRATE 500 MG/ML
INJECTION, SOLUTION INTRAMUSCULAR; INTRAVENOUS PRN
Status: DISCONTINUED | OUTPATIENT
Start: 2023-01-25 | End: 2023-01-25 | Stop reason: SDUPTHER

## 2023-01-25 RX ORDER — HYDROMORPHONE HCL 110MG/55ML
0.5 PATIENT CONTROLLED ANALGESIA SYRINGE INTRAVENOUS EVERY 5 MIN PRN
Status: DISCONTINUED | OUTPATIENT
Start: 2023-01-25 | End: 2023-01-25 | Stop reason: HOSPADM

## 2023-01-25 RX ORDER — SODIUM CHLORIDE 0.9 % (FLUSH) 0.9 %
5-40 SYRINGE (ML) INJECTION EVERY 12 HOURS SCHEDULED
Status: DISCONTINUED | OUTPATIENT
Start: 2023-01-25 | End: 2023-01-25 | Stop reason: HOSPADM

## 2023-01-25 RX ORDER — SODIUM CHLORIDE, SODIUM LACTATE, POTASSIUM CHLORIDE, CALCIUM CHLORIDE 600; 310; 30; 20 MG/100ML; MG/100ML; MG/100ML; MG/100ML
INJECTION, SOLUTION INTRAVENOUS CONTINUOUS
Status: DISCONTINUED | OUTPATIENT
Start: 2023-01-25 | End: 2023-01-25 | Stop reason: HOSPADM

## 2023-01-25 RX ORDER — OXYCODONE AND ACETAMINOPHEN 7.5; 325 MG/1; MG/1
1 TABLET ORAL EVERY 4 HOURS PRN
Qty: 30 TABLET | Refills: 0 | Status: SHIPPED | OUTPATIENT
Start: 2023-01-25 | End: 2023-02-01

## 2023-01-25 RX ORDER — DEXAMETHASONE SODIUM PHOSPHATE 4 MG/ML
INJECTION, SOLUTION INTRA-ARTICULAR; INTRALESIONAL; INTRAMUSCULAR; INTRAVENOUS; SOFT TISSUE PRN
Status: DISCONTINUED | OUTPATIENT
Start: 2023-01-25 | End: 2023-01-25 | Stop reason: SDUPTHER

## 2023-01-25 RX ORDER — MIDAZOLAM HYDROCHLORIDE 2 MG/2ML
2 INJECTION, SOLUTION INTRAMUSCULAR; INTRAVENOUS
Status: DISCONTINUED | OUTPATIENT
Start: 2023-01-25 | End: 2023-01-25 | Stop reason: HOSPADM

## 2023-01-25 RX ORDER — DIPHENHYDRAMINE HYDROCHLORIDE 50 MG/ML
12.5 INJECTION INTRAMUSCULAR; INTRAVENOUS
Status: DISCONTINUED | OUTPATIENT
Start: 2023-01-25 | End: 2023-01-25 | Stop reason: HOSPADM

## 2023-01-25 RX ORDER — KETAMINE HCL IN NACL, ISO-OSM 100MG/10ML
SYRINGE (ML) INJECTION PRN
Status: DISCONTINUED | OUTPATIENT
Start: 2023-01-25 | End: 2023-01-25 | Stop reason: SDUPTHER

## 2023-01-25 RX ORDER — PROPOFOL 10 MG/ML
INJECTION, EMULSION INTRAVENOUS PRN
Status: DISCONTINUED | OUTPATIENT
Start: 2023-01-25 | End: 2023-01-25 | Stop reason: SDUPTHER

## 2023-01-25 RX ORDER — FENTANYL CITRATE 50 UG/ML
INJECTION, SOLUTION INTRAMUSCULAR; INTRAVENOUS PRN
Status: DISCONTINUED | OUTPATIENT
Start: 2023-01-25 | End: 2023-01-25 | Stop reason: SDUPTHER

## 2023-01-25 RX ORDER — ONDANSETRON 2 MG/ML
INJECTION INTRAMUSCULAR; INTRAVENOUS PRN
Status: DISCONTINUED | OUTPATIENT
Start: 2023-01-25 | End: 2023-01-25 | Stop reason: SDUPTHER

## 2023-01-25 RX ORDER — LABETALOL HYDROCHLORIDE 5 MG/ML
10 INJECTION, SOLUTION INTRAVENOUS
Status: DISCONTINUED | OUTPATIENT
Start: 2023-01-25 | End: 2023-01-25 | Stop reason: HOSPADM

## 2023-01-25 RX ORDER — LIDOCAINE HYDROCHLORIDE 10 MG/ML
0.5 INJECTION, SOLUTION EPIDURAL; INFILTRATION; INTRACAUDAL; PERINEURAL ONCE
Status: DISCONTINUED | OUTPATIENT
Start: 2023-01-25 | End: 2023-01-25 | Stop reason: HOSPADM

## 2023-01-25 RX ORDER — MIDAZOLAM HYDROCHLORIDE 1 MG/ML
INJECTION INTRAMUSCULAR; INTRAVENOUS PRN
Status: DISCONTINUED | OUTPATIENT
Start: 2023-01-25 | End: 2023-01-25 | Stop reason: SDUPTHER

## 2023-01-25 RX ORDER — KETAMINE HYDROCHLORIDE 10 MG/ML
INJECTION INTRAMUSCULAR; INTRAVENOUS PRN
Status: DISCONTINUED | OUTPATIENT
Start: 2023-01-25 | End: 2023-01-25

## 2023-01-25 RX ORDER — LIDOCAINE HYDROCHLORIDE 20 MG/ML
INJECTION, SOLUTION INFILTRATION; PERINEURAL PRN
Status: DISCONTINUED | OUTPATIENT
Start: 2023-01-25 | End: 2023-01-25 | Stop reason: SDUPTHER

## 2023-01-25 RX ORDER — ONDANSETRON 2 MG/ML
4 INJECTION INTRAMUSCULAR; INTRAVENOUS
Status: DISCONTINUED | OUTPATIENT
Start: 2023-01-25 | End: 2023-01-25 | Stop reason: HOSPADM

## 2023-01-25 RX ORDER — KETOROLAC TROMETHAMINE 30 MG/ML
INJECTION, SOLUTION INTRAMUSCULAR; INTRAVENOUS PRN
Status: DISCONTINUED | OUTPATIENT
Start: 2023-01-25 | End: 2023-01-25 | Stop reason: SDUPTHER

## 2023-01-25 RX ORDER — SODIUM CHLORIDE 9 MG/ML
25 INJECTION, SOLUTION INTRAVENOUS PRN
Status: DISCONTINUED | OUTPATIENT
Start: 2023-01-25 | End: 2023-01-25 | Stop reason: HOSPADM

## 2023-01-25 RX ORDER — MINERAL OIL 471.99 G/472ML
OIL TOPICAL
Status: COMPLETED | OUTPATIENT
Start: 2023-01-25 | End: 2023-01-25

## 2023-01-25 RX ORDER — FENTANYL CITRATE 50 UG/ML
25 INJECTION, SOLUTION INTRAMUSCULAR; INTRAVENOUS EVERY 5 MIN PRN
Status: DISCONTINUED | OUTPATIENT
Start: 2023-01-25 | End: 2023-01-25 | Stop reason: HOSPADM

## 2023-01-25 RX ADMIN — FENTANYL CITRATE 50 MCG: 50 INJECTION, SOLUTION INTRAMUSCULAR; INTRAVENOUS at 08:52

## 2023-01-25 RX ADMIN — PROPOFOL 200 MG: 10 INJECTION, EMULSION INTRAVENOUS at 08:52

## 2023-01-25 RX ADMIN — ONDANSETRON 4 MG: 2 INJECTION INTRAMUSCULAR; INTRAVENOUS at 08:58

## 2023-01-25 RX ADMIN — LIDOCAINE HYDROCHLORIDE 100 MG: 20 INJECTION, SOLUTION INFILTRATION; PERINEURAL at 08:52

## 2023-01-25 RX ADMIN — KETOROLAC TROMETHAMINE 30 MG: 30 INJECTION, SOLUTION INTRAMUSCULAR; INTRAVENOUS at 09:13

## 2023-01-25 RX ADMIN — DEXAMETHASONE SODIUM PHOSPHATE 8 MG: 4 INJECTION, SOLUTION INTRAMUSCULAR; INTRAVENOUS at 08:56

## 2023-01-25 RX ADMIN — SODIUM CHLORIDE, POTASSIUM CHLORIDE, SODIUM LACTATE AND CALCIUM CHLORIDE: 600; 310; 30; 20 INJECTION, SOLUTION INTRAVENOUS at 07:48

## 2023-01-25 RX ADMIN — Medication 50 MG: at 09:01

## 2023-01-25 RX ADMIN — MIDAZOLAM 1 MG: 1 INJECTION INTRAMUSCULAR; INTRAVENOUS at 08:45

## 2023-01-25 RX ADMIN — OXYCODONE HYDROCHLORIDE 5 MG: 5 TABLET ORAL at 10:20

## 2023-01-25 RX ADMIN — MAGNESIUM SULFATE HEPTAHYDRATE 1 G: 500 INJECTION, SOLUTION INTRAMUSCULAR; INTRAVENOUS at 08:56

## 2023-01-25 RX ADMIN — FENTANYL CITRATE 50 MCG: 50 INJECTION, SOLUTION INTRAMUSCULAR; INTRAVENOUS at 08:58

## 2023-01-25 RX ADMIN — CEFAZOLIN 2000 MG: 2 INJECTION, POWDER, FOR SOLUTION INTRAMUSCULAR; INTRAVENOUS at 08:43

## 2023-01-25 RX ADMIN — MIDAZOLAM 1 MG: 1 INJECTION INTRAMUSCULAR; INTRAVENOUS at 08:50

## 2023-01-25 ASSESSMENT — PAIN SCALES - GENERAL
PAINLEVEL_OUTOF10: 5
PAINLEVEL_OUTOF10: 3

## 2023-01-25 ASSESSMENT — PAIN DESCRIPTION - ORIENTATION
ORIENTATION: LEFT
ORIENTATION: LEFT

## 2023-01-25 ASSESSMENT — PAIN DESCRIPTION - DESCRIPTORS
DESCRIPTORS: DISCOMFORT
DESCRIPTORS: DISCOMFORT

## 2023-01-25 ASSESSMENT — PAIN DESCRIPTION - LOCATION
LOCATION: GROIN
LOCATION: GROIN

## 2023-01-25 ASSESSMENT — PAIN - FUNCTIONAL ASSESSMENT: PAIN_FUNCTIONAL_ASSESSMENT: 0-10

## 2023-01-25 NOTE — DISCHARGE INSTRUCTIONS
Keep left leg elevated all times, dressing dry, f/u 1 week    GENERAL SURGERY DISCHARGE INSTRUCTIONS    Follow your surgeons instructions. Follow up with your surgeon as directed. Observe the operative area for signs of excessive bleeding. If needed apply pressure,elevate if able and contact your surgeon. Observe the operative site for any signs of infection- such as increased pain,redness,fever greater than 101 degrees,swelling, foul odor or drainage. Contact your surgeon if any of these symptoms are present. Keep operative site clean and dry. Do not remove dressing unless instructed to by surgeon. If unable to urinate once you are at home,  notify your surgeon or go to the Emergency Room. Avoid pulling,pushing or tugging to suture line. If you become short of breath call your doctor or go to the ER. Take medications as directed. Pain medication should be taken with food. Do not drive or operate machinery while taking narcotics. For any problems or question call your surgeon.

## 2023-01-25 NOTE — H&P
I have reviewed the history and physical and examined the patient and find no relevant changes. I have reviewed with the patient and/or family the risks, benefits, and alternatives to the procedure. Patient has no known allergies. Last recorded vitals:  /74   Pulse 95   Temp (!) 96.5 °F (35.8 °C) (Temporal)   Resp 20   Ht 5' 2.5\" (1.588 m)   Wt 206 lb (93.4 kg)   LMP 01/13/2023 (Exact Date)   SpO2 100%   BMI 37.08 kg/m²     Past Surgical History:   Procedure Laterality Date    WISDOM TOOTH EXTRACTION Bilateral        Prior to Admission medications    Medication Sig Start Date End Date Taking?  Authorizing Provider   Multiple Vitamins-Minerals (THERAPEUTIC MULTIVITAMIN-MINERALS) tablet Take 1 tablet by mouth daily   Yes Historical Provider, MD   NONFORMULARY Take 2 tablets by mouth in the morning and at bedtime Collagen tablets   Yes Historical Provider, MD   vitamin C (ASCORBIC ACID) 500 MG tablet Take 1,000 mg by mouth daily   Yes Historical Provider, MD   Cholecalciferol (VITAMIN D3) 50 MCG (2000 UT) CAPS Take by mouth daily   Yes Historical Provider, MD   NONFORMULARY Take 2 tablets by mouth in the morning and at bedtime Pre and probiotics   Yes Historical Provider, MD         Current Facility-Administered Medications:     lactated ringers IV soln infusion, , IntraVENous, Continuous, Rosa Hernandez MD, Last Rate: 50 mL/hr at 01/25/23 0748, New Bag at 01/25/23 0748    lidocaine PF 1 % injection 0.5 mL, 0.5 mL, IntraDERmal, Once, Jeannie Galicia MD    ceFAZolin (ANCEF) 2,000 mg in sodium chloride 0.9 % 50 mL IVPB (mini-bag), 2,000 mg, IntraVENous, On Call to OR, Jeannie Galicia MD    sodium chloride flush 0.9 % injection 5-40 mL, 5-40 mL, IntraVENous, 2 times per day, Diana Levy MD    sodium chloride flush 0.9 % injection 5-40 mL, 5-40 mL, IntraVENous, PRN, Floridalma Ohara MD    0.9 % sodium chloride infusion, , IntraVENous, PRN, Diana Levy MD    midazolam PF (VERSED) injection 2 mg, 2 mg, IntraVENous, Once PRN, MD Eliud Borja MD  1/25/2023

## 2023-01-25 NOTE — PROGRESS NOTES
Pt awake and alert at this time. Pt on RA, and VSS. Pt medicated for pain and denies nausea, tolerating PO. Skin warm and dry, palpable pulses and able to wiggle toes. Pt meets criteria to be discharged from Phase 1.

## 2023-01-25 NOTE — ANESTHESIA PRE PROCEDURE
Department of Anesthesiology  Preprocedure Note       Name:  Vero Cheek   Age:  29 y.o.  :  1990                                          MRN:  [id number]         Date:  2023      Surgeon: Bebeto Danielle): Stella Sandoval MD    Procedure: Procedure(s):  EXCISION TRAUMATIC SCAR OF DORSUM LEFT FOOT REPAIR WITH FULL THICKNESS SKIN GRAFT    Medications prior to admission:   Prior to Admission medications    Medication Sig Start Date End Date Taking?  Authorizing Provider   Multiple Vitamins-Minerals (THERAPEUTIC MULTIVITAMIN-MINERALS) tablet Take 1 tablet by mouth daily   Yes Historical Provider, MD   NONFORMULARY Take 2 tablets by mouth in the morning and at bedtime Collagen tablets   Yes Historical Provider, MD   vitamin C (ASCORBIC ACID) 500 MG tablet Take 1,000 mg by mouth daily   Yes Historical Provider, MD   Cholecalciferol (VITAMIN D3) 50 MCG (2000) CAPS Take by mouth daily   Yes Historical Provider, MD   NONFORMULARY Take 2 tablets by mouth in the morning and at bedtime Pre and probiotics   Yes Historical Provider, MD       Current medications:    Current Facility-Administered Medications   Medication Dose Route Frequency Provider Last Rate Last Admin    lactated ringers IV soln infusion   IntraVENous Continuous Hauw Tiera Fonseca MD 50 mL/hr at 23 0748 New Bag at 23 0748    lidocaine PF 1 % injection 0.5 mL  0.5 mL IntraDERmal Once Stella Sandoval MD        ceFAZolin (ANCEF) 2,000 mg in sodium chloride 0.9 % 50 mL IVPB (mini-bag)  2,000 mg IntraVENous On Call to [address] [place] [address]        sodium chloride flush 0.9 % injection 5-40 mL  5-40 mL IntraVENous 2 times per day Ene Barrera MD        sodium chloride flush 0.9 % injection 5-40 mL  5-40 mL IntraVENous PRN Ene Barrera MD        0.9 % sodium chloride infusion   IntraVENous PRN Ene Barrera MD        midazolam PF (VERSED) injection 2 mg  2 mg IntraVENous Once PRN Ene Barrera MD           Allergies:  No Known Allergies    Problem List:    Patient Active Problem List   Diagnosis Code    Vitamin D deficiency E55.9    Contraception [id number]    BMI 31.0-31.9,adult Z68.31       Past Medical History:        Diagnosis Date    Headache(784.0)        Past Surgical History:        Procedure Laterality Date    WISDOM TOOTH EXTRACTION Bilateral        Social History:    Social History     Tobacco Use    Smoking status: Never    Smokeless tobacco: Never   Substance Use Topics    Alcohol use: Yes     Comment: Weekends                                Counseling given: Not Answered      Vital Signs (Current):   Vitals:    01/20/23 1203 01/25/23 0742   BP:  134/74   Pulse:  95   Resp:  20   Temp:  (!) 96.5 °F (35.8 °C)   TempSrc:  Temporal   SpO2:  100%   Weight: 200 lb (90.7 kg) 206 lb (93.4 kg)   Height: 5' 2.5\" (1.588 m) 5' 2.5\" (1.588 m)                                              BP Readings from Last 3 Encounters:   01/25/23 134/74   01/22/22 122/65   05/26/19 119/64       NPO Status: Time of last liquid consumption: 2330                        Time of last solid consumption: 2330                        Date of last liquid consumption: 01/24/23                        Date of last solid food consumption: 01/24/23    BMI:   Wt Readings from Last 3 Encounters:   01/25/23 206 lb (93.4 kg)   01/22/22 203 lb 8 oz (92.3 kg)   05/26/19 188 lb 12.8 oz (85.6 kg)     Body mass index is 37.08 kg/m².     CBC:   Lab Results   Component Value Date/Time    WBC 8.4 01/22/2022 11:43 AM    RBC 4.44 01/22/2022 11:43 AM    HGB 13.3 01/22/2022 11:43 AM    HCT 39.4 01/22/2022 11:43 AM    MCV 88.7 01/22/2022 11:43 AM    RDW 14.3 01/22/2022 11:43 AM     01/22/2022 11:43 AM       CMP:   Lab Results   Component Value Date/Time     03/22/2016 04:08 PM    K 4.3 03/22/2016 04:08 PM     03/22/2016 04:08 PM    CO2 26 03/22/2016 04:08 PM    BUN 11 03/22/2016 04:08 PM    CREATININE 0.8 03/22/2016 04:08 PM    GFRAA >60 03/22/2016 04:08 PM AGRATIO 1.2 03/22/2016 04:08 PM    LABGLOM >60 03/22/2016 04:08 PM    GLUCOSE 58 03/22/2016 04:08 PM    PROT 7.8 03/22/2016 04:08 PM    CALCIUM 9.2 03/22/2016 04:08 PM    BILITOT <0.2 03/22/2016 04:08 PM    ALKPHOS 74 03/22/2016 04:08 PM    AST 15 03/22/2016 04:08 PM    ALT 10 03/22/2016 04:08 PM       POC Tests: No results for input(s): POCGLU, POCNA, POCK, POCCL, POCBUN, POCHEMO, POCHCT in the last 72 hours. Coags: No results found for: PROTIME, INR, APTT    HCG (If Applicable):   Lab Results   Component Value Date    PREGTESTUR Negative 01/25/2023        ABGs: No results found for: PHART, PO2ART, CQZ4GDH, UDA9QAC, BEART, V6FMGSKF     Type & Screen (If Applicable):  No results found for: LABABO, LABRH    Drug/Infectious Status (If Applicable):  No results found for: HIV, HEPCAB    COVID-19 Screening (If Applicable): No results found for: COVID19        Anesthesia Evaluation  Patient summary reviewed no history of anesthetic complications:   Airway: Mallampati: I  TM distance: >3 FB   Neck ROM: full  Mouth opening: > = 3 FB   Dental: normal exam         Pulmonary:Negative Pulmonary ROS                              Cardiovascular:Negative CV ROS                      Neuro/Psych:   Negative Neuro/Psych ROS              GI/Hepatic/Renal: Neg GI/Hepatic/Renal ROS            Endo/Other: Negative Endo/Other ROS                    Abdominal:             Vascular: negative vascular ROS. Other Findings:           Anesthesia Plan      general     ASA 1     (OK for LMA if supine.)  Induction: intravenous. MIPS: Postoperative opioids intended and Prophylactic antiemetics administered. Anesthetic plan and risks discussed with patient. Plan discussed with CRNA.                     Jorje Mcdonough MD   1/25/2023

## 2023-01-25 NOTE — PROGRESS NOTES
Discharge instructions reviewed with patient and pts Maria D Joaquin. All home medications have been reviewed, pt v/u. Discharge instructions signed. Pt discharged via wheelchair. Pt discharged with all belongings. Maria D Joaquin taking stable pt home.

## 2023-01-25 NOTE — PROGRESS NOTES
Pt arrived from OR to PACU bay 2. Report received from OR staff. Pt not yet arousable to voice. Surgical incisions dressings in place to L groin and L foot. Pt on 6L 02, NSR, and VSS. Will continue to monitor.

## 2023-01-25 NOTE — OP NOTE
Operative Note      Patient: Castillo Rodriguez  YOB: 1990  MRN: [id number]    Date of Procedure: 1/25/2023    Pre-Op Diagnosis: Hypertrophic scar [L91.0]    Post-Op Diagnosis: Same       Procedure(s):  EXCISION TRAUMATIC SCAR OF DORSUM LEFT FOOT REPAIR WITH FULL THICKNESS SKIN GRAFT    Surgeon(s): Monika Flores MD    Assistant:   First Assistant: Harry Nathan RN    Anesthesia: General    Estimated Blood Loss (mL): Minimal    Complications: None    Specimens:   ID Type Source Tests Collected by Time Destination   A : A.  LEFT FOOT KELOID Tissue Tissue [facility], MD 1/25/2023 0908        Implants:  * No implants in log *      Drains: * No LDAs found *    Findings:     Detailed Description of Procedure:       Electronically signed by Monika Flores MD on 1/25/2023 at 11:08 AM

## 2023-01-25 NOTE — ANESTHESIA POSTPROCEDURE EVALUATION
Department of Anesthesiology  Postprocedure Note    Patient: Celeste Snellen  MRN: [id number]  YOB: 1990  Date of evaluation: 1/25/2023      Procedure Summary     Date: 01/25/23 Room / Location: [address]    Anesthesia Start: 0845 Anesthesia Stop: 4983    Procedure: EXCISION TRAUMATIC SCAR OF DORSUM LEFT FOOT REPAIR WITH FULL THICKNESS SKIN GRAFT (Left) Diagnosis:       Hypertrophic scar      (Hypertrophic scar [L91.0])    Surgeons: Reggie Ortiz MD Responsible Provider:     Anesthesia Type: general ASA Status: 1          Anesthesia Type: No value filed.     Rex Phase I: Rex Score: 7    Rex Phase II:        Anesthesia Post Evaluation    Patient location during evaluation: PACU  Patient participation: complete - patient participated  Level of consciousness: awake  Airway patency: patent  Nausea & Vomiting: no vomiting and no nausea  Complications: no  Cardiovascular status: hemodynamically stable  Respiratory status: acceptable  Hydration status: stable  Multimodal analgesia pain management approach

## 2023-01-26 NOTE — OP NOTE
[place] [address]                     [facility], [facility]                                OPERATIVE REPORT    PATIENT NAME: Keli Mcdonald                :        1990  MED REC NO:   [id number]                          ROOM:  ACCOUNT NO:   [de-identified]                           ADMIT DATE: 2023  PROVIDER:     Christopher Antonio MD    DATE OF PROCEDURE:  2023    PREOPERATIVE DIAGNOSIS:  Large cicatrix keloid that measured 9 x 5 cm of  the dorsum of her left foot. POSTOPERATIVE DIAGNOSIS:  Large cicatrix keloid that measured 9 x 5 cm  of the dorsum of her left foot. OPERATION PERFORMED:  Wide excision and repair of the full-thickness  skin graft. SURGEON:  Christopher Antonio MD    ANESTHESIA:  General.    ESTIMATED BLOOD LOSS:  Minimal.    INDICATIONS:  This is a 56-year-old black female who sustained a complex  injury to the dorsum of the left foot, which was allowed to granulate. This had left a very large tender prominent keloid of 9 x 5 cm that she  has had multiple steroid injections without improvement. Therefore,  after discussing further options, she elected to proceed with definitive  incision and repair of the full-thickness skin graft. The issue of  bleeding, infection, wound dehiscence, and scarring, as well as the  issue of recurrent keloid as well as total partial loss of the flap  itself was discussed and consent was obtained. OPERATIVE PROCEDURE:  The patient was taken to the operating room on  2023 and placed in the supine position, at which time general  anesthesia was obtained. The leg and the left groin were sterilely  prepped and draped in the usual fashion using ChloraPrep solution. We  then excised the keloid itself at hemostasis and obtained full-thickness  skin graft of the left groin. Dorsi was closed at the hemostasis using  3-0 Vicryl subcutaneous and subcuticular.   Applied the Steri-Strips,  Telfa, and Tegaderm dressing. Skin graft was appropriately defatted and placed in the recipient site  using 4-0 Vicryl. Bolster dressing with a Xeroform and cotton ball  soaked in mineral oil was then placed and secured with 4-0 Vicryl. Compressive garland of Ace wrap was then placed. The patient was taken  to the recovery room in satisfactory condition. No complications were  noted. At the end of the procedure, sponge, needle, and instrument  counts were entirely correct. INSTRUCTIONS:  Keep the leg elevated at all times. She is only allowed  to ambulate to go to restroom. Follow up will be in one week. I did  give her a script for Percocet as well as Keflex.         Ly Alfonso MD    D: 01/25/2023 17:17:22       T: 01/25/2023 18:56:37     [email or web address]  Job#: [id number]     Doc#: [id number]    CC:

## (undated) DEVICE — HYPODERMIC SAFETY NEEDLE: Brand: MAGELLAN

## (undated) DEVICE — PAD,NON-ADHERENT,3X8,STERILE,LF,1/PK: Brand: MEDLINE

## (undated) DEVICE — GAUZE,SPONGE,4"X4",16PLY,XRAY,STRL,LF: Brand: MEDLINE

## (undated) DEVICE — GLOVE SURG SZ 65 THK91MIL LTX FREE SYN POLYISOPRENE

## (undated) DEVICE — SUTURE VCRL SZ 3-0 L18IN ABSRB UD PS-2 L19MM 1/2 CIR J497G

## (undated) DEVICE — SYRINGE MED 10ML LUERLOCK TIP W/O SFTY DISP

## (undated) DEVICE — STRIP SKIN CLOSURE 4X0.25 IN REINF N WOVEN WHT STERI STRP

## (undated) DEVICE — MASC TURNOVER KIT: Brand: MEDLINE INDUSTRIES, INC.

## (undated) DEVICE — SUTURE VCRL + SZ 3-0 L18IN ABSRB UD PS-2 3/8 CIR REV CUT VCP497H

## (undated) DEVICE — MASTISOL ADHESIVE LIQ 2/3ML

## (undated) DEVICE — TOWEL,OR,DSP,ST,BLUE,STD,4/PK,20PK/CS: Brand: MEDLINE

## (undated) DEVICE — SOLUTION IRRIG 500ML 0.9% SOD CHL USP POUR PLAS BTL

## (undated) DEVICE — DRESSING,GAUZE,XEROFORM,CURAD,5"X9",ST: Brand: CURAD

## (undated) DEVICE — APPLICATOR MEDICATED 26 CC SOLUTION HI LT ORNG CHLORAPREP

## (undated) DEVICE — PACK PROCEDURE SURG EXTREMITY MFFOP CUST

## (undated) DEVICE — PAD ABSRB W8XL10IN ABD HYDROPHOBIC NONWOVEN THCK LAYR CELOS

## (undated) DEVICE — STERILE COTTON BALLS LARGE 5/P: Brand: MEDLINE

## (undated) DEVICE — SUTURE VCRL + SZ 4-0  L18IN RB 1  CR ABSRB VCP714D

## (undated) DEVICE — ELECTRODE PT RET AD L9FT HI MOIST COND ADH HYDRGEL CORDED

## (undated) DEVICE — MEDICINE CUP, GRADUATED, STER: Brand: MEDLINE

## (undated) DEVICE — 3M™ TEGADERM™ TRANSPARENT FILM DRESSING FRAME STYLE, 1628, 6 IN X 8 IN (15 CM X 20 CM), 10/CT 8CT/CASE: Brand: 3M™ TEGADERM™